# Patient Record
Sex: FEMALE | Race: BLACK OR AFRICAN AMERICAN | NOT HISPANIC OR LATINO | Employment: FULL TIME | URBAN - METROPOLITAN AREA
[De-identification: names, ages, dates, MRNs, and addresses within clinical notes are randomized per-mention and may not be internally consistent; named-entity substitution may affect disease eponyms.]

---

## 2018-07-31 ENCOUNTER — HOSPITAL ENCOUNTER (EMERGENCY)
Facility: HOSPITAL | Age: 28
Discharge: HOME | End: 2018-07-31
Attending: EMERGENCY MEDICINE
Payer: COMMERCIAL

## 2018-07-31 VITALS
DIASTOLIC BLOOD PRESSURE: 66 MMHG | TEMPERATURE: 98.1 F | HEIGHT: 60 IN | WEIGHT: 128 LBS | OXYGEN SATURATION: 100 % | HEART RATE: 75 BPM | SYSTOLIC BLOOD PRESSURE: 116 MMHG | BODY MASS INDEX: 25.13 KG/M2 | RESPIRATION RATE: 16 BRPM

## 2018-07-31 DIAGNOSIS — M54.50 ACUTE BILATERAL LOW BACK PAIN WITHOUT SCIATICA: Primary | ICD-10-CM

## 2018-07-31 PROCEDURE — 99281 EMR DPT VST MAYX REQ PHY/QHP: CPT | Mod: U5

## 2018-07-31 RX ORDER — IBUPROFEN 600 MG/1
600 TABLET ORAL EVERY 6 HOURS PRN
COMMUNITY

## 2018-07-31 RX ORDER — CYCLOBENZAPRINE HCL 10 MG
10 TABLET ORAL 3 TIMES DAILY PRN
Qty: 21 TABLET | Refills: 0 | Status: SHIPPED | OUTPATIENT
Start: 2018-07-31 | End: 2018-08-07

## 2018-08-01 ENCOUNTER — HOSPITAL ENCOUNTER (EMERGENCY)
Facility: HOSPITAL | Age: 28
Discharge: HOME | End: 2018-08-01
Attending: EMERGENCY MEDICINE
Payer: COMMERCIAL

## 2018-08-01 ENCOUNTER — APPOINTMENT (EMERGENCY)
Dept: RADIOLOGY | Facility: HOSPITAL | Age: 28
End: 2018-08-01
Attending: EMERGENCY MEDICINE
Payer: COMMERCIAL

## 2018-08-01 VITALS
SYSTOLIC BLOOD PRESSURE: 111 MMHG | HEART RATE: 74 BPM | TEMPERATURE: 98.9 F | OXYGEN SATURATION: 100 % | DIASTOLIC BLOOD PRESSURE: 67 MMHG | RESPIRATION RATE: 20 BRPM

## 2018-08-01 DIAGNOSIS — M54.5 ACUTE MIDLINE LOW BACK PAIN, WITH SCIATICA PRESENCE UNSPECIFIED: Primary | ICD-10-CM

## 2018-08-01 LAB
B-HCG UR QL: NEGATIVE
BACTERIA #/AREA URNS HPF: ABNORMAL /HPF
BILIRUB UR QL STRIP.AUTO: NEGATIVE MG/DL
CLARITY UR REFRACT.AUTO: ABNORMAL
COLOR UR AUTO: YELLOW
GLUCOSE UR STRIP.AUTO-MCNC: NEGATIVE MG/DL
HGB UR QL STRIP.AUTO: NEGATIVE
HYALINE CASTS #/AREA URNS LPF: ABNORMAL /LPF
KETONES UR STRIP.AUTO-MCNC: NEGATIVE MG/DL
LEUKOCYTE ESTERASE UR QL STRIP.AUTO: NEGATIVE
NITRITE UR QL STRIP.AUTO: NEGATIVE
PH UR STRIP.AUTO: 7.5 [PH]
PROT UR QL STRIP.AUTO: ABNORMAL
RBC #/AREA URNS HPF: ABNORMAL /HPF
SP GR UR REFRACT.AUTO: 1.03
SQUAMOUS URNS QL MICRO: 1 /HPF
UROBILINOGEN UR STRIP-ACNC: 0.2 EU/DL
WBC #/AREA URNS HPF: ABNORMAL /HPF

## 2018-08-01 PROCEDURE — 96372 THER/PROPH/DIAG INJ SC/IM: CPT

## 2018-08-01 PROCEDURE — 63600000 HC DRUGS/DETAIL CODE: Performed by: EMERGENCY MEDICINE

## 2018-08-01 PROCEDURE — 99283 EMERGENCY DEPT VISIT LOW MDM: CPT | Mod: 25

## 2018-08-01 PROCEDURE — 3E0133Z INTRODUCTION OF ANTI-INFLAMMATORY INTO SUBCUTANEOUS TISSUE, PERCUTANEOUS APPROACH: ICD-10-PCS | Performed by: EMERGENCY MEDICINE

## 2018-08-01 PROCEDURE — 72100 X-RAY EXAM L-S SPINE 2/3 VWS: CPT

## 2018-08-01 PROCEDURE — 81003 URINALYSIS AUTO W/O SCOPE: CPT | Performed by: PHYSICIAN ASSISTANT

## 2018-08-01 PROCEDURE — 81025 URINE PREGNANCY TEST: CPT | Performed by: PHYSICIAN ASSISTANT

## 2018-08-01 RX ORDER — KETOROLAC TROMETHAMINE 30 MG/ML
30 INJECTION, SOLUTION INTRAMUSCULAR; INTRAVENOUS ONCE
Status: COMPLETED | OUTPATIENT
Start: 2018-08-01 | End: 2018-08-01

## 2018-08-01 RX ORDER — METHYLPREDNISOLONE 4 MG/1
4 TABLET ORAL SEE ADMIN INSTRUCTIONS
Qty: 1 TABLET | Refills: 0 | Status: SHIPPED | OUTPATIENT
Start: 2018-08-01

## 2018-08-01 RX ADMIN — KETOROLAC TROMETHAMINE 30 MG: 30 INJECTION, SOLUTION INTRAMUSCULAR at 19:47

## 2018-08-01 ASSESSMENT — ENCOUNTER SYMPTOMS
PSYCHIATRIC NEGATIVE: 1
COLOR CHANGE: 0
DIARRHEA: 0
SORE THROAT: 0
COUGH: 0
BACK PAIN: 0
NUMBNESS: 0
FLANK PAIN: 0
NAUSEA: 0
FEVER: 0
LIGHT-HEADEDNESS: 0
SEIZURES: 0
WEAKNESS: 0
CHILLS: 0
CHEST TIGHTNESS: 0
PALPITATIONS: 0
VOMITING: 0
ABDOMINAL PAIN: 0
DIZZINESS: 0
TROUBLE SWALLOWING: 0
SHORTNESS OF BREATH: 0
HEMATURIA: 0
HEADACHES: 0
DYSURIA: 0
NECK PAIN: 0

## 2018-08-01 NOTE — ED ATTESTATION NOTE
I have personally seen and examined the patient.  I reviewed and agree with physician assistant / nurse practitioner’s assessment and plan of care, with the following exceptions: None  My examination, assessment, and plan of care of Aisha Valdez is as follows:    Low back pain radiating into bilateral legs over past 2-3 weeks, worse after up standing at work all day. No trauma. No fever, no spinal injections, no paresthesias, no focal weakness. No urinary retention, no bowel/bladder incontinence.    Exam: Extrem: 2+ DP/PT, sensation intact, power 5/5 Bilateral hip flexors, knee flexors/extensors, dorsi/plantar flexion  Plan: Xray lumbar spine, UA to r/o UTI and pregnancy  NSAIDs, medrol pack, muscle relaxers, suspected MS pain.  Doubt cauda equina or epidural abscess, as afebrile, no risk factors, normal strength bilateral legs, no urinary retention or incontinence.       Hetal Fournier MD  08/01/18 9653

## 2018-08-01 NOTE — ED ATTESTATION NOTE
The patient was evaluated and managed by the physician assistant / nurse practitioner.       Alden Kennedy MD  08/01/18 1511

## 2018-08-01 NOTE — ED PROVIDER NOTES
HPI     Chief Complaint   Patient presents with   • Back Pain       Patient is a 28-year-old female who came the emergency room tonight due to back pain.  Patient reports she has had pain to her back about 2-3 weeks now.  Patient cannot think of anything she may have done to injure her back but thinking back she said she moved a couple weeks ago but she is pretty sure somebody moved all of her stuff but she cannot think of any heavy boxes she may have moved.  Patient reports took some ibuprofen and Tylenol for the pain that did not really help.  Patient also states that she went to [facility] yesterday she states that they evaluated her did not really do much and gave her a muscle relaxer.  Patient reports the medication is not working and has continued pain in her back so came in the emergency room for evaluation.  Patient denies to me she has any numbness or tingling going down her legs denies feeling weak in the muscles of her legs 2.             Patient History     History reviewed. No pertinent past medical history.    History reviewed. No pertinent surgical history.    History reviewed. No pertinent family history.    Social History   Substance Use Topics   • Smoking status: Never Smoker   • Smokeless tobacco: Never Used   • Alcohol use Yes      Comment: socially       Systems Reviewed from Nursing Triage:          Review of Systems     Review of Systems   Constitutional: Negative for chills and fever.   HENT: Negative for sore throat and trouble swallowing.    Respiratory: Negative for cough, chest tightness and shortness of breath.    Cardiovascular: Negative for chest pain and palpitations.   Gastrointestinal: Negative for abdominal pain, diarrhea, nausea and vomiting.   Genitourinary: Negative for dysuria, flank pain and hematuria.   Musculoskeletal: Negative for back pain and neck pain.   Skin: Negative for color change and rash.   Neurological: Negative for dizziness, seizures, syncope, weakness,  light-headedness, numbness and headaches.   Psychiatric/Behavioral: Negative.    All other systems reviewed and are negative.       Physical Exam     ED Triage Vitals [08/01/18 1715]   Temp Heart Rate Resp BP SpO2   37.2 °C (98.9 °F) 74 20 111/67 100 %      Temp src Heart Rate Source Patient Position BP Location FiO2 (%) (Set)   -- -- -- -- --                     Patient Vitals for the past 24 hrs:   BP Temp Pulse Resp SpO2   08/01/18 1715 111/67 37.2 °C (98.9 °F) 74 20 100 %           Physical Exam   Constitutional: She is oriented to person, place, and time. She appears well-developed and well-nourished.   Neck: Normal range of motion. Neck supple.   Cardiovascular: Normal rate, regular rhythm and normal heart sounds.    Pulmonary/Chest: Effort normal and breath sounds normal.   Abdominal: Soft. Bowel sounds are normal.   Musculoskeletal: Normal range of motion. She exhibits no edema or deformity.   Neurological: She is alert and oriented to person, place, and time. No cranial nerve deficit or sensory deficit. She exhibits normal muscle tone. Coordination normal.   Skin: Skin is warm and dry. Capillary refill takes less than 2 seconds.   Vitals reviewed.           Procedures    ED Course & MDM     Labs Reviewed   UA REFLEX CULTURE (MACROSCOPIC) - Abnormal        Result Value    Clarity, Urine Cloudy (*)     Protein, Urine Trace (*)     Color, Urine Yellow      Specific Gravity, Urine 1.026      pH, Urine 7.5      Leukocyte Esterase Negative      Nitrite, Urine Negative      Glucose, Urine Negative      Ketones, Urine Negative      Urobilinogen, Urine 0.2      Bilirubin, Urine Negative      Blood, Urine Negative     UA MICROSCOPIC - Abnormal     WBC, Urine 4 TO 10 (*)     Squamous Epithelial +1 (*)     Hyaline Cast 3 TO 9 (*)     Bacteria, Urine Rare (*)     RBC, Urine 0 TO 4     BHCG, URINE, QUAL - Normal    BHCG, Qualitative Urine Negative     URINALYSIS REFLEX CULTURE    Narrative:     The following orders were  created for panel order Urinalysis with Reflex Culture.  Procedure                               Abnormality         Status                     ---------                               -----------         ------                     UA Reflex to Culture (Mac...[[id number]]  Abnormal            Final result               UA Microscopic[[id number]]                Abnormal            Final result                 Please view results for these tests on the individual orders.       X-RAY LUMBAR SPINE 2 OR 3 VIEWS   Final Result   IMPRESSION: Normal two view exam of the lumbar spine              MDM  Number of Diagnoses or Management Options  Acute midline low back pain, with sciatica presence unspecified:      Amount and/or Complexity of Data Reviewed  Clinical lab tests: reviewed  Tests in the radiology section of CPT®: reviewed             ED Course          Clinical Impressions as of Aug 01 2100   Acute midline low back pain, with sciatica presence unspecified        BETH Winters  08/01/18 2100

## 2018-08-01 NOTE — DISCHARGE INSTRUCTIONS
Please call and follow up with your primary care  And or Dr. Null at next appointment. Return here if your symptoms change, get worse or if you have any other concerns.

## 2018-08-01 NOTE — ED PROVIDER NOTES
HPI     Chief Complaint   Patient presents with   • Back Pain       The patient is a 28-year-old female who presents to the emergency department for evaluation of back pain that has been going on for the last 2 weeks.  The patient states that she is having radiating pain into her thighs.  She does have mild numbness and tingling.  She denies weakness.  She denies chest pain or shortness of breath.  She denies dysuria, urinary incontinence, bowel incontinence or urinary retention.  She denies any prior injuries.  She states that she did move recently with someone moved for her.  She has been taking Motrin with little relief.  She has not seen her doctor for these symptoms.             Patient History     History reviewed. No pertinent past medical history.    History reviewed. No pertinent surgical history.    History reviewed. No pertinent family history.    Social History   Substance Use Topics   • Smoking status: Never Smoker   • Smokeless tobacco: Never Used   • Alcohol use Yes      Comment: socially       Systems Reviewed from Nursing Triage:          Review of Systems     Review of Systems   All other systems reviewed and are negative.       Physical Exam     ED Triage Vitals [07/31/18 1745]   Temp Heart Rate Resp BP SpO2   37.1 °C (98.8 °F) 69 16 106/65 100 %      Temp Source Heart Rate Source Patient Position BP Location FiO2 (%) (Set)   Oral -- -- -- --       Pulse Ox %: 100 % (07/31/18 2000)  Pulse Ox Interpretation: Normal (07/31/18 2000)          Patient Vitals for the past 24 hrs:   BP Temp Temp src Pulse Resp SpO2 Height Weight   07/31/18 1956 116/66 36.7 °C (98.1 °F) Oral 75 16 100 % - -   07/31/18 1745 106/65 37.1 °C (98.8 °F) Oral 69 16 100 % 1.524 m (5') 58.1 kg (128 lb)           Physical Exam   Constitutional: She is oriented to person, place, and time. She appears well-developed and well-nourished.   HENT:   Head: Normocephalic and atraumatic.   Eyes: Conjunctivae are normal.   Neck: Neck supple.    Cardiovascular: Normal rate, regular rhythm, normal heart sounds and intact distal pulses.    Pulmonary/Chest: Effort normal and breath sounds normal.   Abdominal: Soft. Bowel sounds are normal. There is no tenderness.   Musculoskeletal:        Lumbar back: She exhibits tenderness (diffuse lumbar, no cva tenderness.).   Neurological: She is alert and oriented to person, place, and time. She has normal strength.   Pt pulses +2 b/l.  Distal sensation intact.  Strength 5/5 b/l le.  slr neg bilaterally.    Skin: Skin is warm and dry.   Psychiatric: She has a normal mood and affect.   Nursing note and vitals reviewed.           Procedures    ED Course & MDM     Labs Reviewed - No data to display    No orders to display           MDM  Number of Diagnoses or Management Options  Acute bilateral low back pain without sciatica:   Diagnosis management comments: She is otherwise the patient is a 28-year-old female who presents the emergency department for evaluation of lower back pain.  Her symptoms have been going on for the last 2 weeks.  Neurologically, her examination is intact.  There is no evidence for cauda equina or epidural abscess.  She does not have true sciatica.  I will prescribe her Flexeril to take as well as the Motrin.  She will need to follow-up with a family doctor for further evaluation.    Patient Progress  Patient progress: stable           ED Course          Clinical Impressions as of Jul 31 2016   Acute bilateral low back pain without sciatica        BETH Francis  07/31/18 2028

## 2021-01-11 ENCOUNTER — APPOINTMENT (RX ONLY)
Dept: URBAN - METROPOLITAN AREA CLINIC 374 | Facility: CLINIC | Age: 31
Setting detail: DERMATOLOGY
End: 2021-01-11

## 2021-01-11 DIAGNOSIS — L21.8 OTHER SEBORRHEIC DERMATITIS: ICD-10-CM

## 2021-01-11 DIAGNOSIS — L70.0 ACNE VULGARIS: ICD-10-CM

## 2021-01-11 PROCEDURE — 99204 OFFICE O/P NEW MOD 45 MIN: CPT

## 2021-01-11 PROCEDURE — ? PRESCRIPTION

## 2021-01-11 PROCEDURE — ? COUNSELING

## 2021-01-11 PROCEDURE — ? PRESCRIPTION MEDICATION MANAGEMENT

## 2021-01-11 PROCEDURE — ? PRESCRIPTION SAMPLES PROVIDED

## 2021-01-11 PROCEDURE — ? MEDICATION COUNSELING

## 2021-01-11 RX ORDER — TRETINOIN 0.25 MG/G
CREAM TOPICAL QHS
Qty: 1 | Refills: 3 | Status: ERX | COMMUNITY
Start: 2021-01-11

## 2021-01-11 RX ORDER — BENZOYL PEROXIDE 5 G/100G
LIQUID TOPICAL QAM
Qty: 1 | Refills: 3 | Status: ERX | COMMUNITY
Start: 2021-01-11

## 2021-01-11 RX ORDER — HYDROCORTISONE 25 MG/ML
LOTION TOPICAL BID
Qty: 1 | Refills: 3 | Status: ERX | COMMUNITY
Start: 2021-01-11

## 2021-01-11 RX ORDER — CLINDAMYCIN PHOSPHATE 10 MG/ML
LOTION TOPICAL QAM
Qty: 1 | Refills: 3 | Status: ERX | COMMUNITY
Start: 2021-01-11

## 2021-01-11 RX ADMIN — CLINDAMYCIN PHOSPHATE: 10 LOTION TOPICAL at 00:00

## 2021-01-11 RX ADMIN — BENZOYL PEROXIDE: 5 LIQUID TOPICAL at 00:00

## 2021-01-11 RX ADMIN — TRETINOIN: 0.25 CREAM TOPICAL at 00:00

## 2021-01-11 RX ADMIN — HYDROCORTISONE: 25 LOTION TOPICAL at 00:00

## 2021-01-11 ASSESSMENT — LOCATION DETAILED DESCRIPTION DERM
LOCATION DETAILED: LEFT INFERIOR CENTRAL MALAR CHEEK
LOCATION DETAILED: RIGHT INFERIOR LATERAL MALAR CHEEK
LOCATION DETAILED: LEFT MEDIAL MALAR CHEEK

## 2021-01-11 ASSESSMENT — LOCATION SIMPLE DESCRIPTION DERM
LOCATION SIMPLE: LEFT CHEEK
LOCATION SIMPLE: RIGHT CHEEK

## 2021-01-11 ASSESSMENT — LOCATION ZONE DERM: LOCATION ZONE: FACE

## 2021-01-11 NOTE — PROCEDURE: MEDICATION COUNSELING
Pt is traveling in [place] now. Pt has a bad cold and  needs a referal from  to a urgent care.    Methotrexate Pregnancy And Lactation Text: This medication is Pregnancy Category X and is known to cause fetal harm. This medication is excreted in breast milk.

## 2021-01-11 NOTE — PROCEDURE: MEDICATION COUNSELING
Xelloretoz Pregnancy And Lactation Text: This medication is Pregnancy Category D and is not considered safe during pregnancy.  The risk during breast feeding is also uncertain.

## 2021-01-11 NOTE — PROCEDURE: PRESCRIPTION MEDICATION MANAGEMENT
Plan: Recommend the patient to discontinue the retin-a cream if she is trying to get pregnant
Continue Regimen: Retin-A 0.025 % topical cream: apply a pea size amount QHS to acne of face\\nOTC cerave moisturizer qam to the face
Detail Level: Zone
Initiate Treatment: benzoyl peroxide 5 % topical cleanser: Wash affected area of acne of face qam in the shower\\nclindamycin 1 % lotion: Apply a thin layer  to affected area QAM after shower\\nOTC cerave moisturizer qhs to the face
Render In Strict Bullet Format?: No
Initiate Treatment: hydrocortisone 2.5 % lotion: Apply a thin layer to dry skin of face Bid

## 2021-01-11 NOTE — PROCEDURE: COUNSELING
Tazorac Pregnancy And Lactation Text: This medication is not safe during pregnancy. It is unknown if this medication is excreted in breast milk.
Birth Control Pills Counseling: Birth Control Pill Counseling: I discussed with the patient the potential side effects of OCPs including but not limited to increased risk of stroke, heart attack, thrombophlebitis, deep venous thrombosis, hepatic adenomas, breast changes, GI upset, headaches, and depression.  The patient verbalized understanding of the proper use and possible adverse effects of OCPs. All of the patient's questions and concerns were addressed.
Doxycycline Counseling:  Patient counseled regarding possible photosensitivity and increased risk for sunburn.  Patient instructed to avoid sunlight, if possible.  When exposed to sunlight, patients should wear protective clothing, sunglasses, and sunscreen.  The patient was instructed to call the office immediately if the following severe adverse effects occur:  hearing changes, easy bruising/bleeding, severe headache, or vision changes.  The patient verbalized understanding of the proper use and possible adverse effects of doxycycline.  All of the patient's questions and concerns were addressed.
Minocycline Counseling: Patient advised regarding possible photosensitivity and discoloration of the teeth, skin, lips, tongue and gums.  Patient instructed to avoid sunlight, if possible.  When exposed to sunlight, patients should wear protective clothing, sunglasses, and sunscreen.  The patient was instructed to call the office immediately if the following severe adverse effects occur:  hearing changes, easy bruising/bleeding, severe headache, or vision changes.  The patient verbalized understanding of the proper use and possible adverse effects of minocycline.  All of the patient's questions and concerns were addressed.
Topical Sulfur Applications Pregnancy And Lactation Text: This medication is Pregnancy Category C and has an unknown safety profile during pregnancy. It is unknown if this topical medication is excreted in breast milk.
Isotretinoin Counseling: Patient should get monthly blood tests, not donate blood, not drive at night if vision affected, not share medication, and not undergo elective surgery for 6 months after tx completed. Side effects reviewed, pt to contact office should one occur.
Spironolactone Counseling: Patient advised regarding risks of diarrhea, abdominal pain, hyperkalemia, birth defects (for female patients), liver toxicity and renal toxicity. The patient may need blood work to monitor liver and kidney function and potassium levels while on therapy. The patient verbalized understanding of the proper use and possible adverse effects of spironolactone.  All of the patient's questions and concerns were addressed.
Tazorac Counseling:  Patient advised that medication is irritating and drying.  Patient may need to apply sparingly and wash off after an hour before eventually leaving it on overnight.  The patient verbalized understanding of the proper use and possible adverse effects of tazorac.  All of the patient's questions and concerns were addressed.
Benzoyl Peroxide Counseling: Patient counseled that medicine may cause skin irritation and bleach clothing.  In the event of skin irritation, the patient was advised to reduce the amount of the drug applied or use it less frequently.   The patient verbalized understanding of the proper use and possible adverse effects of benzoyl peroxide.  All of the patient's questions and concerns were addressed.
Bactrim Pregnancy And Lactation Text: This medication is Pregnancy Category D and is known to cause fetal risk.  It is also excreted in breast milk.
Topical Sulfur Applications Counseling: Topical Sulfur Counseling: Patient counseled that this medication may cause skin irritation or allergic reactions.  In the event of skin irritation, the patient was advised to reduce the amount of the drug applied or use it less frequently.   The patient verbalized understanding of the proper use and possible adverse effects of topical sulfur application.  All of the patient's questions and concerns were addressed.
Dapsone Pregnancy And Lactation Text: This medication is Pregnancy Category C and is not considered safe during pregnancy or breast feeding.
Erythromycin Pregnancy And Lactation Text: This medication is Pregnancy Category B and is considered safe during pregnancy. It is also excreted in breast milk.
Sarecycline Pregnancy And Lactation Text: This medication is Pregnancy Category D and not consider safe during pregnancy. It is also excreted in breast milk.
High Dose Vitamin A Pregnancy And Lactation Text: High dose vitamin A therapy is contraindicated during pregnancy and breast feeding.
Use Enhanced Medication Counseling?: No
Topical Clindamycin Pregnancy And Lactation Text: This medication is Pregnancy Category B and is considered safe during pregnancy. It is unknown if it is excreted in breast milk.
Topical Retinoid Pregnancy And Lactation Text: This medication is Pregnancy Category C. It is unknown if this medication is excreted in breast milk.
Dapsone Counseling: I discussed with the patient the risks of dapsone including but not limited to hemolytic anemia, agranulocytosis, rashes, methemoglobinemia, kidney failure, peripheral neuropathy, headaches, GI upset, and liver toxicity.  Patients who start dapsone require monitoring including baseline LFTs and weekly CBCs for the first month, then every month thereafter.  The patient verbalized understanding of the proper use and possible adverse effects of dapsone.  All of the patient's questions and concerns were addressed.
Bactrim Counseling:  I discussed with the patient the risks of sulfa antibiotics including but not limited to GI upset, allergic reaction, drug rash, diarrhea, dizziness, photosensitivity, and yeast infections.  Rarely, more serious reactions can occur including but not limited to aplastic anemia, agranulocytosis, methemoglobinemia, blood dyscrasias, liver or kidney failure, lung infiltrates or desquamative/blistering drug rashes.
Erythromycin Counseling:  I discussed with the patient the risks of erythromycin including but not limited to GI upset, allergic reaction, drug rash, diarrhea, increase in liver enzymes, and yeast infections.
High Dose Vitamin A Counseling: Side effects reviewed, pt to contact office should one occur.
Tetracycline Counseling: Patient counseled regarding possible photosensitivity and increased risk for sunburn.  Patient instructed to avoid sunlight, if possible.  When exposed to sunlight, patients should wear protective clothing, sunglasses, and sunscreen.  The patient was instructed to call the office immediately if the following severe adverse effects occur:  hearing changes, easy bruising/bleeding, severe headache, or vision changes.  The patient verbalized understanding of the proper use and possible adverse effects of tetracycline.  All of the patient's questions and concerns were addressed. Patient understands to avoid pregnancy while on therapy due to potential birth defects.
Sarecycline Counseling: Patient advised regarding possible photosensitivity and discoloration of the teeth, skin, lips, tongue and gums.  Patient instructed to avoid sunlight, if possible.  When exposed to sunlight, patients should wear protective clothing, sunglasses, and sunscreen.  The patient was instructed to call the office immediately if the following severe adverse effects occur:  hearing changes, easy bruising/bleeding, severe headache, or vision changes.  The patient verbalized understanding of the proper use and possible adverse effects of sarecycline.  All of the patient's questions and concerns were addressed.
Topical Retinoid counseling:  Patient advised to apply a pea-sized amount only at bedtime and wait 30 minutes after washing their face before applying.  If too drying, patient may add a non-comedogenic moisturizer. The patient verbalized understanding of the proper use and possible adverse effects of retinoids.  All of the patient's questions and concerns were addressed.
Azithromycin Pregnancy And Lactation Text: This medication is considered safe during pregnancy and is also secreted in breast milk.
Topical Clindamycin Counseling: Patient counseled that this medication may cause skin irritation or allergic reactions.  In the event of skin irritation, the patient was advised to reduce the amount of the drug applied or use it less frequently.   The patient verbalized understanding of the proper use and possible adverse effects of clindamycin.  All of the patient's questions and concerns were addressed.
Doxycycline Pregnancy And Lactation Text: This medication is Pregnancy Category D and not consider safe during pregnancy. It is also excreted in breast milk but is considered safe for shorter treatment courses.
Birth Control Pills Pregnancy And Lactation Text: This medication should be avoided if pregnant and for the first 30 days post-partum.
Isotretinoin Pregnancy And Lactation Text: This medication is Pregnancy Category X and is considered extremely dangerous during pregnancy. It is unknown if it is excreted in breast milk.
Detail Level: Zone
Benzoyl Peroxide Pregnancy And Lactation Text: This medication is Pregnancy Category C. It is unknown if benzoyl peroxide is excreted in breast milk.
Spironolactone Pregnancy And Lactation Text: This medication can cause feminization of the male fetus and should be avoided during pregnancy. The active metabolite is also found in breast milk.
Azithromycin Counseling:  I discussed with the patient the risks of azithromycin including but not limited to GI upset, allergic reaction, drug rash, diarrhea, and yeast infections.
Detail Level: Detailed

## 2022-03-29 ENCOUNTER — HOSPITAL ENCOUNTER (EMERGENCY)
Facility: HOSPITAL | Age: 32
Discharge: HOME | End: 2022-03-29
Attending: EMERGENCY MEDICINE
Payer: COMMERCIAL

## 2022-03-29 ENCOUNTER — APPOINTMENT (EMERGENCY)
Dept: RADIOLOGY | Facility: HOSPITAL | Age: 32
End: 2022-03-29
Attending: EMERGENCY MEDICINE
Payer: COMMERCIAL

## 2022-03-29 VITALS
RESPIRATION RATE: 16 BRPM | DIASTOLIC BLOOD PRESSURE: 68 MMHG | HEIGHT: 60 IN | TEMPERATURE: 98 F | OXYGEN SATURATION: 100 % | BODY MASS INDEX: 28.66 KG/M2 | SYSTOLIC BLOOD PRESSURE: 119 MMHG | HEART RATE: 80 BPM | WEIGHT: 146 LBS

## 2022-03-29 DIAGNOSIS — R07.9 CHEST PAIN, UNSPECIFIED TYPE: Primary | ICD-10-CM

## 2022-03-29 LAB
ALBUMIN SERPL-MCNC: 4.6 G/DL (ref 3.4–5)
ALP SERPL-CCNC: 55 IU/L (ref 35–126)
ALT SERPL-CCNC: 25 IU/L (ref 11–54)
ANION GAP SERPL CALC-SCNC: 8 MEQ/L (ref 3–15)
AST SERPL-CCNC: 36 IU/L (ref 15–41)
BASOPHILS # BLD: 0.06 K/UL (ref 0.01–0.1)
BASOPHILS NFR BLD: 0.7 %
BILIRUB SERPL-MCNC: 1.2 MG/DL (ref 0.3–1.2)
BUN SERPL-MCNC: 7 MG/DL (ref 8–20)
CALCIUM SERPL-MCNC: 9.4 MG/DL (ref 8.9–10.3)
CHLORIDE SERPL-SCNC: 99 MEQ/L (ref 98–109)
CO2 SERPL-SCNC: 25 MEQ/L (ref 22–32)
CREAT SERPL-MCNC: 1 MG/DL (ref 0.6–1.1)
D DIMER PPP IA.FEU-MCNC: 0.4 UG/ML FEU (ref 0–0.5)
DIFFERENTIAL METHOD BLD: NORMAL
EOSINOPHIL # BLD: 0.11 K/UL (ref 0.04–0.36)
EOSINOPHIL NFR BLD: 1.2 %
ERYTHROCYTE [DISTWIDTH] IN BLOOD BY AUTOMATED COUNT: 12.7 % (ref 11.7–14.4)
GFR SERPL CREATININE-BSD FRML MDRD: >60 ML/MIN/1.73M*2
GLUCOSE SERPL-MCNC: 92 MG/DL (ref 70–99)
HCG UR QL: NEGATIVE
HCT VFR BLDCO AUTO: 43.3 % (ref 35–45)
HGB BLD-MCNC: 14.3 G/DL (ref 11.8–15.7)
IMM GRANULOCYTES # BLD AUTO: 0.04 K/UL (ref 0–0.08)
IMM GRANULOCYTES NFR BLD AUTO: 0.4 %
LYMPHOCYTES # BLD: 3.2 K/UL (ref 1.2–3.5)
LYMPHOCYTES NFR BLD: 35 %
MCH RBC QN AUTO: 28.4 PG (ref 28–33.2)
MCHC RBC AUTO-ENTMCNC: 33 G/DL (ref 32.2–35.5)
MCV RBC AUTO: 85.9 FL (ref 83–98)
MONOCYTES # BLD: 0.47 K/UL (ref 0.28–0.8)
MONOCYTES NFR BLD: 5.1 %
NEUTROPHILS # BLD: 5.26 K/UL (ref 1.7–7)
NEUTS SEG NFR BLD: 57.6 %
NRBC BLD-RTO: 0 %
PDW BLD AUTO: 10.9 FL (ref 9.4–12.3)
PLATELET # BLD AUTO: 331 K/UL (ref 150–369)
PLATELET # BLD EST: NORMAL 10*3/UL
PLATELET CLUMP BLD QL SMEAR: PRESENT
POTASSIUM SERPL-SCNC: 4.4 MEQ/L (ref 3.6–5.1)
PROT SERPL-MCNC: 8.1 G/DL (ref 6–8.2)
RBC # BLD AUTO: 5.04 M/UL (ref 3.93–5.22)
SODIUM SERPL-SCNC: 132 MEQ/L (ref 136–144)
TROPONIN I SERPL HS-MCNC: <2 PG/ML
WBC # BLD AUTO: 9.14 K/UL (ref 3.8–10.5)

## 2022-03-29 PROCEDURE — 84703 CHORIONIC GONADOTROPIN ASSAY: CPT | Performed by: EMERGENCY MEDICINE

## 2022-03-29 PROCEDURE — 36415 COLL VENOUS BLD VENIPUNCTURE: CPT | Performed by: EMERGENCY MEDICINE

## 2022-03-29 PROCEDURE — 93005 ELECTROCARDIOGRAM TRACING: CPT

## 2022-03-29 PROCEDURE — 85379 FIBRIN DEGRADATION QUANT: CPT | Performed by: PHYSICIAN ASSISTANT

## 2022-03-29 PROCEDURE — 85025 COMPLETE CBC W/AUTO DIFF WBC: CPT | Performed by: EMERGENCY MEDICINE

## 2022-03-29 PROCEDURE — 84484 ASSAY OF TROPONIN QUANT: CPT | Performed by: EMERGENCY MEDICINE

## 2022-03-29 PROCEDURE — 80053 COMPREHEN METABOLIC PANEL: CPT | Performed by: EMERGENCY MEDICINE

## 2022-03-29 PROCEDURE — 99283 EMERGENCY DEPT VISIT LOW MDM: CPT | Mod: 25

## 2022-03-29 PROCEDURE — 93005 ELECTROCARDIOGRAM TRACING: CPT | Performed by: EMERGENCY MEDICINE

## 2022-03-29 PROCEDURE — 71046 X-RAY EXAM CHEST 2 VIEWS: CPT

## 2022-03-29 ASSESSMENT — ENCOUNTER SYMPTOMS
COUGH: 0
SHORTNESS OF BREATH: 0
HEADACHES: 0
VOMITING: 0
WEAKNESS: 0
LIGHT-HEADEDNESS: 0
ABDOMINAL PAIN: 0
DIARRHEA: 0
COLOR CHANGE: 0
NUMBNESS: 0
FEVER: 0

## 2022-03-29 NOTE — ED PROVIDER NOTES
Emergency Medicine Note  HPI   HISTORY OF PRESENT ILLNESS     Healthy 32-year-old female presents with intermittent chest pain.  Patient states she is had intermittent chest pain for the past couple of weeks.  States an abscess on the left another tooth on the right.  Sometimes it is in 1 arm and sometimes it is in the other arm.  Sometimes it is in her breast sometimes is under her breast.  Sometimes it shoots to her shoulder blades.  Sometimes it goes to her neck.  States she was in [place] on March 3-6 and since then she feels like it has been more frequent.  Denies fever, chills, lightheadedness, dizziness, headache, shortness of breath, cough, abdominal pain, nausea, numbness, tingling, weakness.            Patient History   PAST HISTORY     Reviewed from Nursing Triage:         History reviewed. No pertinent past medical history.    History reviewed. No pertinent surgical history.    History reviewed. No pertinent family history.    Social History     Tobacco Use   • Smoking status: Never Smoker   • Smokeless tobacco: Never Used   Substance Use Topics   • Alcohol use: Yes     Comment: socially   • Drug use: No         Review of Systems   REVIEW OF SYSTEMS     Review of Systems   Constitutional: Negative for fever.   Respiratory: Negative for cough and shortness of breath.    Cardiovascular: Positive for chest pain.   Gastrointestinal: Negative for abdominal pain, diarrhea and vomiting.   Skin: Negative for color change.   Neurological: Negative for weakness, light-headedness, numbness and headaches.         VITALS     ED Vitals    Date/Time Temp Pulse Resp BP SpO2 [facility]   03/29/22 2105 36.7 °C (98 °F) 80 16 119/68 100 % CH   03/29/22 1946 -- 83 18 130/72 98 %    03/29/22 1904 36.3 °C (97.4 °F) 81 20 121/77 100 % SLS        Pulse Ox %: 100 % (03/29/22 1931)  Pulse Ox Interpretation: Normal (03/29/22 1931)  Heart Rate: 81 (03/29/22 1931)  Rhythm Strip Interpretation: Normal Sinus Rhythm (03/29/22 1931)      Physical Exam   PHYSICAL EXAM     Physical Exam  Vitals and nursing note reviewed.   Constitutional:       General: She is not in acute distress.     Appearance: She is well-developed.   HENT:      Head: Atraumatic.   Eyes:      Conjunctiva/sclera: Conjunctivae normal.   Cardiovascular:      Rate and Rhythm: Normal rate and regular rhythm.   Pulmonary:      Effort: Pulmonary effort is normal.      Breath sounds: Normal breath sounds.   Chest:      Chest wall: No tenderness.   Abdominal:      General: Bowel sounds are normal.      Palpations: Abdomen is soft.      Tenderness: There is no abdominal tenderness.   Musculoskeletal:         General: No deformity.   Skin:     General: Skin is warm and dry.   Neurological:      General: No focal deficit present.      Mental Status: She is alert and oriented to person, place, and time.   Psychiatric:         Behavior: Behavior normal.           PROCEDURES     Procedures     DATA     Results     Procedure Component Value Units Date/Time    D-dimer, quantitative [[id number]]  (Normal) Collected: 03/29/22 1959    Specimen: Blood, Venous Updated: 03/29/22 2028     D-Dimer, Quant 0.40 ug/mL FEU      Comment: The D-Dimer assay can be used as an aid in the diagnosis of DVT or PE. The test can not be used by itself to exclude DVT or PE. When used as a diagnostic aid, the cutoff value is the same as the reference range: <0.5 ug/ml FEU.       HS Troponin I (with 2 hour reflex) [[id number]]  (Normal) Collected: 03/29/22 1909    Specimen: Blood, Venous Updated: 03/29/22 2016     High Sens Troponin I <2.0 pg/mL     Comprehensive metabolic panel [[id number]]  (Abnormal) Collected: 03/29/22 1909    Specimen: Blood, Venous Updated: 03/29/22 2012     Sodium 132 mEQ/L      Potassium 4.4 mEQ/L      Comment: Results obtained on plasma. Plasma Potassium values may be up to 0.4 mEQ/L less than serum values. The differences may be greater for patients with high platelet or white cell counts.  MODERATE  HEMOLYSIS, RESULT MAY BE INCREASED.        Chloride 99 mEQ/L      CO2 25 mEQ/L      BUN 7 mg/dL      Creatinine 1.0 mg/dL      Glucose 92 mg/dL      Calcium 9.4 mg/dL      AST (SGOT) 36 IU/L      Comment: MODERATE HEMOLYSIS, RESULT MAY BE INCREASED.        ALT (SGPT) 25 IU/L      Comment: MODERATE HEMOLYSIS, RESULT MAY BE INCREASED.        Alkaline Phosphatase 55 IU/L      Total Protein 8.1 g/dL      Comment: Test performed on plasma which typically contains approximately 0.4 g/dL more protein than serum.        Albumin 4.6 g/dL      Bilirubin, Total 1.2 mg/dL      Comment: MODERATE HEMOLYSIS, RESULT MAY BE INCREASED.        eGFR >60.0 mL/min/1.73m*2      Anion Gap 8 mEQ/L     CBC and differential [[id number]] Collected: 03/29/22 1909    Specimen: Blood, Venous Updated: 03/29/22 2002     WBC 9.14 K/uL      RBC 5.04 M/uL      Hemoglobin 14.3 g/dL      Hematocrit 43.3 %      MCV 85.9 fL      MCH 28.4 pg      MCHC 33.0 g/dL      RDW 12.7 %      Platelets 331 K/uL      Comment: ALL RESULTS HAVE BEEN RECHECKED. PLT CLUMPING SUSPECTED. PLT COUNT MAY BE SLIGHTLY HIGHER THAN REPORTED. IF CLINICALLY WARRANTED, SUGGEST COLLECTING SODIUM CITRATE TUBE (BLUE TOP) IN ADDITION TO EDTA TUBE FOR FOLLOW UP CBC TESTING.. CONFIRMED WITH SMEAR   ESTIMATE. SPECIMEN QUALITY CHECKED. RESULTS OBTAINED AFTER VORTEXING TO ELIMINATE PLT CLUMPS        MPV 10.9 fL      Differential Type Auto     nRBC 0.0 %      Immature Granulocytes 0.4 %      Neutrophils 57.6 %      Lymphocytes 35.0 %      Monocytes 5.1 %      Eosinophils 1.2 %      Basophils 0.7 %      Immature Granulocytes, Absolute 0.04 K/uL      Neutrophils, Absolute 5.26 K/uL      Lymphocytes, Absolute 3.20 K/uL      Monocytes, Absolute 0.47 K/uL      Eosinophils, Absolute 0.11 K/uL      Basophils, Absolute 0.06 K/uL      Platelet Estimate Adequate (150,000-400,000)     Clumped Platelets Present    hCG, serum, qualitative [[id number]]  (Normal) Collected: 03/29/22 1909    Specimen: Blood,  Venous Updated: 03/29/22 1942     Preg Test, Serum Negative    RAINBOW LT BLUE [[id number]] Collected: 03/29/22 1909    Specimen: Blood, Venous Updated: 03/29/22 1919    [place] Draw Panel [[id number]] Collected: 03/29/22 1909    Specimen: Blood, Venous Updated: 03/29/22 1919    Narrative:      The following orders were created for panel order [place] Draw Panel.  Procedure                               Abnormality         Status                     ---------                               -----------         ------                     RAINBOW RED[[id number]]                                       In process                 RAINBOW LT BLUE[[id number]]                                   In process                   Please view results for these tests on the individual orders.    RAINBOW RED [[id number]] Collected: 03/29/22 1909    Specimen: Blood, Venous Updated: 03/29/22 1919          Imaging Results          X-RAY CHEST 2 VIEWS (Preliminary result)  Result time 03/29/22 20:53:46    ED Interpretation    nad                              ECG 12 lead   ED Interpretation   Normal sinus rhythm rate 891 bpm normal axis narrow QRS no ST segment changes          Scoring tools                                 ED Course & MDM   MDM / ED COURSE / CLINICAL IMPRESSIONS / DISPO     MDM    ED Course as of 03/29/22 2356   Tue Mar 29, 2022   2013 Intermittent chest pain s x 5 weeks, did go to [place] recently     [DB]      ED Course User Index  [DB] Clarissa Waddell PA C         Clinical Impressions as of 03/29/22 2356   Chest pain, unspecified type     Discharge         Clarissa Waddell PA C  03/29/22 2357

## 2022-03-30 LAB
ATRIAL RATE: 81
P AXIS: 67
PR INTERVAL: 136
QRS DURATION: 78
QT INTERVAL: 382
QTC CALCULATION(BAZETT): 443
R AXIS: 0
T WAVE AXIS: 37
VENTRICULAR RATE: 81

## 2022-03-30 NOTE — DISCHARGE INSTRUCTIONS
Lab work and imaging unremarkable.  Do not know the cause of your chest pain but is not life-threatening.  Follow-up with your primary care doctor for further evaluation  Return to the ED at any time for worsening symptoms.      Rendering Text In Billing: The slides were read, and reported in an attached document.

## 2022-04-12 NOTE — ED ATTESTATION NOTE
Procedures  Physical Exam  Review of Systems    4/12/202212:59 AM  I have personally seen and examined the patient.  I reviewed and agree with the PA/NP/Resident's assessment and plan of care.    My examination, assessment, and plan of care of Aisha Valdez is as follows:  The patient presents with sharp pain in left chest, occasionally radiating to one or both arms, to shoulder blades or under breast. No SOB. Recent travel to [place]. No sob or fever, cough, etc. No cardiac hx.  Exam:Alert in NAD. There is reproducible tenderness about the affected areas. Lungs clear, heart regular and normal rate. No leg edema or tenderness.  Impression/Plan: Pt evaluated with EKG, CXR, labs incl troponin and d-dimer.  All studies supported dx of musculoskeletal CP. Labs normal, CXR NAD, and ekg w/o ischemia. Pt d/c to use rest, heat, and NSAID. F/U with PCP.    Vital Sign Review: Vital signs have been ordered and reviewed. The oxygen saturation is 100% on R/A, normal.   I was physically present for the key/critical portions of the following procedures: None    This document was created using dragon dictation software.  There might be some typographical errors due to this technology.     Isidoro Ritchie,   04/12/22 0106

## 2022-11-17 ENCOUNTER — HOSPITAL ENCOUNTER (EMERGENCY)
Facility: HOSPITAL | Age: 32
Discharge: HOME | End: 2022-11-17
Attending: EMERGENCY MEDICINE
Payer: COMMERCIAL

## 2022-11-17 VITALS
RESPIRATION RATE: 16 BRPM | DIASTOLIC BLOOD PRESSURE: 56 MMHG | OXYGEN SATURATION: 100 % | WEIGHT: 141 LBS | BODY MASS INDEX: 27.68 KG/M2 | HEART RATE: 72 BPM | SYSTOLIC BLOOD PRESSURE: 115 MMHG | HEIGHT: 60 IN | TEMPERATURE: 98.5 F

## 2022-11-17 DIAGNOSIS — R20.2 PARESTHESIA OF FOOT, BILATERAL: Primary | ICD-10-CM

## 2022-11-17 LAB
ALBUMIN SERPL-MCNC: 4.5 G/DL (ref 3.4–5)
ALP SERPL-CCNC: 52 IU/L (ref 35–126)
ALT SERPL-CCNC: 15 IU/L (ref 11–54)
ANION GAP SERPL CALC-SCNC: 8 MEQ/L (ref 3–15)
AST SERPL-CCNC: 18 IU/L (ref 15–41)
BASOPHILS # BLD: 0.08 K/UL (ref 0.01–0.1)
BASOPHILS NFR BLD: 0.8 %
BILIRUB SERPL-MCNC: 0.5 MG/DL (ref 0.3–1.2)
BUN SERPL-MCNC: 10 MG/DL (ref 8–20)
CALCIUM SERPL-MCNC: 9.6 MG/DL (ref 8.9–10.3)
CHLORIDE SERPL-SCNC: 101 MEQ/L (ref 98–109)
CO2 SERPL-SCNC: 28 MEQ/L (ref 22–32)
CREAT SERPL-MCNC: 0.8 MG/DL (ref 0.6–1.1)
DIFFERENTIAL METHOD BLD: NORMAL
EOSINOPHIL # BLD: 0.13 K/UL (ref 0.04–0.36)
EOSINOPHIL NFR BLD: 1.3 %
ERYTHROCYTE [DISTWIDTH] IN BLOOD BY AUTOMATED COUNT: 12.3 % (ref 11.7–14.4)
GFR SERPL CREATININE-BSD FRML MDRD: >60 ML/MIN/1.73M*2
GLUCOSE SERPL-MCNC: 105 MG/DL (ref 70–99)
HCT VFR BLDCO AUTO: 41.4 % (ref 35–45)
HGB BLD-MCNC: 13.9 G/DL (ref 11.8–15.7)
IMM GRANULOCYTES # BLD AUTO: 0.03 K/UL (ref 0–0.08)
IMM GRANULOCYTES NFR BLD AUTO: 0.3 %
LYMPHOCYTES # BLD: 3.1 K/UL (ref 1.2–3.5)
LYMPHOCYTES NFR BLD: 32.1 %
MCH RBC QN AUTO: 29.1 PG (ref 28–33.2)
MCHC RBC AUTO-ENTMCNC: 33.6 G/DL (ref 32.2–35.5)
MCV RBC AUTO: 86.6 FL (ref 83–98)
MONOCYTES # BLD: 0.57 K/UL (ref 0.28–0.8)
MONOCYTES NFR BLD: 5.9 %
NEUTROPHILS # BLD: 5.75 K/UL (ref 1.7–7)
NEUTS SEG NFR BLD: 59.6 %
NRBC BLD-RTO: 0 %
PDW BLD AUTO: 9.5 FL (ref 9.4–12.3)
PLATELET # BLD AUTO: 296 K/UL (ref 150–369)
POTASSIUM SERPL-SCNC: 3.9 MEQ/L (ref 3.6–5.1)
PROT SERPL-MCNC: 7.6 G/DL (ref 6–8.2)
RBC # BLD AUTO: 4.78 M/UL (ref 3.93–5.22)
SODIUM SERPL-SCNC: 137 MEQ/L (ref 136–144)
WBC # BLD AUTO: 9.66 K/UL (ref 3.8–10.5)

## 2022-11-17 PROCEDURE — 36415 COLL VENOUS BLD VENIPUNCTURE: CPT | Performed by: STUDENT IN AN ORGANIZED HEALTH CARE EDUCATION/TRAINING PROGRAM

## 2022-11-17 PROCEDURE — 80053 COMPREHEN METABOLIC PANEL: CPT | Performed by: STUDENT IN AN ORGANIZED HEALTH CARE EDUCATION/TRAINING PROGRAM

## 2022-11-17 PROCEDURE — 99283 EMERGENCY DEPT VISIT LOW MDM: CPT

## 2022-11-17 PROCEDURE — 85025 COMPLETE CBC W/AUTO DIFF WBC: CPT | Performed by: STUDENT IN AN ORGANIZED HEALTH CARE EDUCATION/TRAINING PROGRAM

## 2022-11-17 ASSESSMENT — ENCOUNTER SYMPTOMS
ARTHRALGIAS: 1
WEAKNESS: 0
FEVER: 0
WOUND: 0
NUMBNESS: 0
BACK PAIN: 0
CHILLS: 0
PALPITATIONS: 0
SHORTNESS OF BREATH: 0
NAUSEA: 0
VOMITING: 0
MUSCULOSKELETAL PAIN: 1
COLOR CHANGE: 0
MYALGIAS: 0
DIZZINESS: 0
LIGHT-HEADEDNESS: 0
ABDOMINAL PAIN: 0

## 2022-11-17 NOTE — ED ATTESTATION NOTE
The patient was evaluated and managed by the PA/NP/resident.  I have discussed the case with the PA/NP/resident and I have reviewed the patients lab work.  I am in agreement with the ED workup and treatment plan.  I will continue to be available for consultation as needed.     Godshall, Duane K, MD  11/17/22 6243

## 2022-11-17 NOTE — DISCHARGE INSTRUCTIONS
You were seen in the ER for bilateral foot tingling (paresthesias).  Your blood work was negative for acute signs of infection, anemia, electrolyte imbalance.  Your blood glucose was unremarkable.    It is recommended that you follow-up with your primary care physician for further evaluation management.  Please call make an appointment to be seen soon as possible.    You may take Motrin (400 mg) every 6 hours for pain. You may also take Tylenol (500 mg) every 6 hours for pain. You may alternate the two medications every 3 hours for complete pain control.     Please return to the ER for any worsening pain, numbness or weakness, trouble walking, chest pain, trouble breathing, lightheadedness, dizziness, fevers, chills, or any other new or concerning symptoms.

## 2022-11-17 NOTE — ED PROVIDER NOTES
Emergency Medicine Note  HPI   HISTORY OF PRESENT ILLNESS       History provided by:  Patient   used: No       33 y/o F with no significant PMH presents the ED for evaluation of bilateral foot pain and tingling.  Patient reports that she has had the symptoms for approximately 1 year.  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  Pain symptoms have no triggers or alleviating factors.  They are intermittent.  She was cooling her symptoms online and was concerned for possible diabetes.  She denies any trouble walking, fevers, chills, leg swelling, redness, warmth, bruising, lightheadedness, dizziness, chest pain, trouble breathing, fevers, chills, lacerations or abrasions.  No recent falls or injuries.  No recent long distance travel or immobilizations.  No recent surgeries.  No history of PE or DVT.  No history of back issues.  She does not follow with her primary care physician regularly.    PCP - Basia        Patient History   PAST HISTORY     Reviewed from Nursing Triage:  Tobacco  Allergies  Meds  Problems  Med Hx  Surg Hx  Fam Hx  Soc   Hx      History reviewed. No pertinent past medical history.    History reviewed. No pertinent surgical history.    History reviewed. No pertinent family history.    Social History     Tobacco Use    Smoking status: Never    Smokeless tobacco: Never   Substance Use Topics    Alcohol use: Yes     Comment: socially    Drug use: No         Review of Systems   REVIEW OF SYSTEMS     Review of Systems   Constitutional: Negative for chills and fever.   Respiratory: Negative for shortness of breath.    Cardiovascular: Negative for chest pain, palpitations and leg swelling.   Gastrointestinal: Negative for abdominal pain, nausea and vomiting.   Musculoskeletal: Positive for arthralgias (B/L foot pain and tingling). Negative for back pain, gait problem and myalgias.   Skin: Negative for color change, rash and wound.   Neurological:  Negative for dizziness, weakness, light-headedness and numbness.         VITALS     ED Vitals    Date/Time Temp Pulse Resp BP SpO2 [facility]   11/17/22 1816 -- 72 16 115/56 100 % DCB   11/17/22 1607 36.9 °C (98.5 °F) 99 18 128/77 99 % KP        Pulse Ox %: 99 % (11/17/22 1607)  Pulse Ox Interpretation: Normal (11/17/22 1607)  Heart Rate: 99 (11/17/22 1607)        Physical Exam   PHYSICAL EXAM     Physical Exam  Vitals and nursing note reviewed.   Constitutional:       General: She is not in acute distress.     Appearance: She is well-developed and well-nourished.   HENT:      Head: Normocephalic and atraumatic.   Eyes:      Extraocular Movements: EOM normal.   Cardiovascular:      Rate and Rhythm: Normal rate and regular rhythm.      Pulses: Normal pulses and intact distal pulses.           Dorsalis pedis pulses are 2+ on the right side and 2+ on the left side.        Posterior tibial pulses are 2+ on the right side and 2+ on the left side.      Heart sounds: Normal heart sounds.   Pulmonary:      Effort: Pulmonary effort is normal. No respiratory distress.      Breath sounds: Normal breath sounds.   Abdominal:      Palpations: Abdomen is soft.      Tenderness: There is no abdominal tenderness.   Musculoskeletal:         General: No swelling, tenderness or deformity. Normal range of motion.      Cervical back: Normal range of motion and neck supple.      Comments: No midline thoracic or lumbar tenderness.  No vertebral step-off or deformity.  No SI joint tenderness bilaterally.      No lower extremity edema or calf tenderness. Negative marco antonio's sign. Sensation to light touch intact in bilateral feet. Gait intact   Lymphadenopathy:      Cervical: No cervical adenopathy.   Skin:     General: Skin is warm and dry.      Findings: No rash.   Neurological:      Mental Status: She is alert and oriented to person, place, and time.      Comments: Sensation light touch intact in bilateral lower extremities.  Differentiation between  sharp and dull.  5/5 strength with dorsal plantar flexion bilaterally.  Gait intact.   Psychiatric:         Mood and Affect: Mood and affect normal.           PROCEDURES     Procedures     DATA     Results     Procedure Component Value Units Date/Time    Comprehensive metabolic panel [[id number]]  (Abnormal) Collected: 11/17/22 1817    Specimen: Blood, Venous Updated: 11/17/22 1841     Sodium 137 mEQ/L      Potassium 3.9 mEQ/L      Comment: Results obtained on plasma. Plasma Potassium values may be up to 0.4 mEQ/L less than serum values. The differences may be greater for patients with high platelet or white cell counts.  SLIGHT HEMOLYSIS, RESULT MAY BE INCREASED.        Chloride 101 mEQ/L      CO2 28 mEQ/L      BUN 10 mg/dL      Creatinine 0.8 mg/dL      Glucose 105 mg/dL      Calcium 9.6 mg/dL      AST (SGOT) 18 IU/L      Comment: SLIGHT HEMOLYSIS, RESULT MAY BE INCREASED.        ALT (SGPT) 15 IU/L      Comment: SLIGHT HEMOLYSIS, RESULT MAY BE INCREASED.        Alkaline Phosphatase 52 IU/L      Total Protein 7.6 g/dL      Comment: Test performed on plasma which typically contains approximately 0.4 g/dL more protein than serum.        Albumin 4.5 g/dL      Bilirubin, Total 0.5 mg/dL      Comment: SLIGHT HEMOLYSIS, RESULT MAY BE INCREASED.        eGFR >60.0 mL/min/1.73m*2      Anion Gap 8 mEQ/L     CBC and differential [[id number]] Collected: 11/17/22 1817    Specimen: Blood, Venous Updated: 11/17/22 1825     WBC 9.66 K/uL      RBC 4.78 M/uL      Hemoglobin 13.9 g/dL      Hematocrit 41.4 %      MCV 86.6 fL      MCH 29.1 pg      MCHC 33.6 g/dL      RDW 12.3 %      Platelets 296 K/uL      MPV 9.5 fL      Differential Type Auto     nRBC 0.0 %      Immature Granulocytes 0.3 %      Neutrophils 59.6 %      Lymphocytes 32.1 %      Monocytes 5.9 %      Eosinophils 1.3 %      Basophils 0.8 %      Immature Granulocytes, Absolute 0.03 K/uL      Neutrophils, Absolute 5.75 K/uL      Lymphocytes, Absolute 3.10 K/uL      Monocytes,  Absolute 0.57 K/uL      Eosinophils, Absolute 0.13 K/uL      Basophils, Absolute 0.08 K/uL     Extra Tube Lt Blue [[id number]] Collected: 11/17/22 1817    Specimen: Blood, Venous Updated: 11/17/22 1822    Extra Tube Red [[id number]] Collected: 11/17/22 1817    Specimen: Blood, Venous Updated: 11/17/22 1822    Extra Tube Gold [[id number]] Collected: 11/17/22 1817    Specimen: Blood, Venous Updated: 11/17/22 1822    Extra Tubes [[id number]] Collected: 11/17/22 1817    Specimen: Blood, Venous Updated: 11/17/22 1822    Narrative:      The following orders were created for panel order Extra Tubes.  Procedure                               Abnormality         Status                     ---------                               -----------         ------                     Extra Tube Lt Blue[[id number]]                               In process                 Extra Tube Red[[id number]]                                   In process                 Extra Tube Lt Green[[id number]]                              In process                 Extra Tube Gold[[id number]]                                  In process                   Please view results for these tests on the individual orders.    Extra Tube Lt Green [[id number]] Collected: 11/17/22 1817    Specimen: Blood, Venous Updated: 11/17/22 1822          Imaging Results    None         No orders to display       Scoring tools                                  ED Course & MDM   MDM / ED COURSE / CLINICAL IMPRESSION / DISPO     MDM    ED Course as of 11/17/22 1859   Thu Nov 17, 2022   1800 Impression: B/L feet myalgias and tingling x 1 year, no pertinent PMH - patient concerned for diabetes    No lower extremity edema or calf tenderness. Negative marco antonio's sign. No midline lumbar tenderness. No SI joint tenderness. Sensation to light touch intact in bilateral feet. Gait intact.     Offered evaluation with POCT glucose and patient requesting labs for evaluation of diabetes and electrolyte.      Plan: labs    Discussed case with Dr. Willard [EB]   1826 CBC unremarkable. [EB]   1846 CMP unremarkable.  Will discharge home with PCP follow-up, supportive care, strict return precautions. [EB]   1858 Patient discharged by myself. No IV in place. All questions answered.  [EB]      ED Course User Index  [EB] Yesenia Kim PA C     Clinical Impression      Paresthesia of foot, bilateral     _________________     ED Disposition   Discharge                   Yesenia Kim PA C  11/17/22 4819